# Patient Record
Sex: FEMALE | Race: WHITE | Employment: FULL TIME | URBAN - METROPOLITAN AREA
[De-identification: names, ages, dates, MRNs, and addresses within clinical notes are randomized per-mention and may not be internally consistent; named-entity substitution may affect disease eponyms.]

---

## 2020-03-25 ENCOUNTER — VIRTUAL VISIT (OUTPATIENT)
Dept: FAMILY MEDICINE CLINIC | Age: 30
End: 2020-03-25

## 2020-03-25 DIAGNOSIS — F33.1 MODERATE EPISODE OF RECURRENT MAJOR DEPRESSIVE DISORDER (HCC): Primary | ICD-10-CM

## 2020-03-25 DIAGNOSIS — E28.2 PCOS (POLYCYSTIC OVARIAN SYNDROME): ICD-10-CM

## 2020-03-25 PROBLEM — R87.619 ABNORMAL PAP SMEAR OF CERVIX: Status: ACTIVE | Noted: 2020-03-25

## 2020-03-25 PROBLEM — H52.229 REGULAR ASTIGMATISM: Status: ACTIVE | Noted: 2020-03-25

## 2020-03-25 PROBLEM — K21.9 ESOPHAGEAL REFLUX: Status: ACTIVE | Noted: 2020-03-25

## 2020-03-25 PROBLEM — F17.210 CIGARETTE SMOKER: Status: ACTIVE | Noted: 2020-03-25

## 2020-03-25 PROBLEM — M19.012 PRIMARY OSTEOARTHRITIS, LEFT SHOULDER: Status: ACTIVE | Noted: 2020-03-25

## 2020-03-25 PROBLEM — R87.619 ABNORMAL PAP SMEAR OF CERVIX: Status: RESOLVED | Noted: 2020-03-25 | Resolved: 2020-03-25

## 2020-03-25 RX ORDER — VENLAFAXINE HYDROCHLORIDE 37.5 MG/1
37.5 CAPSULE, EXTENDED RELEASE ORAL DAILY
Qty: 90 CAP | Refills: 0 | Status: SHIPPED | OUTPATIENT
Start: 2020-03-25 | End: 2020-04-27 | Stop reason: DRUGHIGH

## 2020-03-25 RX ORDER — HYDROXYZINE PAMOATE 25 MG/1
25 CAPSULE ORAL
Qty: 30 CAP | Refills: 0 | Status: SHIPPED | OUTPATIENT
Start: 2020-03-25 | End: 2020-07-10
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
## 2020-04-27 ENCOUNTER — VIRTUAL VISIT (OUTPATIENT)
Dept: FAMILY MEDICINE CLINIC | Age: 30
End: 2020-04-27

## 2020-04-27 DIAGNOSIS — F33.1 MODERATE EPISODE OF RECURRENT MAJOR DEPRESSIVE DISORDER (HCC): Primary | ICD-10-CM

## 2020-04-27 RX ORDER — VENLAFAXINE HYDROCHLORIDE 75 MG/1
75 CAPSULE, EXTENDED RELEASE ORAL DAILY
Qty: 30 CAP | Refills: 0 | Status: SHIPPED | OUTPATIENT
Start: 2020-04-27 | End: 2020-07-10 | Stop reason: DRUGHIGH

## 2020-04-27 RX ORDER — CRANBERRY FRUIT EXTRACT 650 MG
CAPSULE ORAL DAILY
COMMUNITY
End: 2021-08-31

## 2020-04-27 NOTE — PROGRESS NOTES
1. Have you been to the ER, urgent care clinic since your last visit? Hospitalized since your last visit? No    2. Have you seen or consulted any other health care providers outside of the [address] [place] [address] since your last visit? Include any pap smears or colon screening.  No    Chief Complaint   Patient presents with    Follow Up Chronic Condition    Depression     A little better with Effexor

## 2020-04-27 NOTE — PROGRESS NOTES
Krysta Amos. [address]      Heber Stanley is a 26 y.o. female who was seen by synchronous (real-time) audio-video technology on 4/27/2020. Consent: Heber Stanley, who was seen by synchronous (real-time) audio-video technology, and/or her healthcare decision maker, is aware that this patient-initiated, Telehealth encounter on 4/27/2020 is a billable service, with coverage as determined by her insurance carrier. She is aware that she may receive a bill and has provided verbal consent to proceed: Yes. Assessment & Plan:   Diagnoses and all orders for this visit:    1. Moderate episode of recurrent major depressive disorder (HCC)  -     venlafaxine-SR (EFFEXOR-XR) 75 mg capsule; Take 1 Cap by mouth daily. States she is still feeling depressed although may be slightly better  Has not found a psychiatrist yet, recommended she try going to the website for psychology today to find podiatrist/therapist  States she still having anxiety that is not helped with the hydroxyzine  We will increase her Effexor to 75 mg XR once a day  States she still having insomnia that is not helped with the hydroxyzine, informed her she could take 2 capsules for sleep    This note was dictated using Dragon dictation system. Every effort was made to ensure accuracy, although occasional spelling errors and word substitutions are expected due to dictation system limitations. Thank you for your understanding and patience. Follow-up and Dispositions    · Return in about 4 weeks (around 5/25/2020) for depression, virtual visit, 15 minutes. 712  Subjective:   Heber Stanley is a 26 y.o. female who was seen for   Follow Up Chronic Condition and Depression (A little better with Effexor 37.5mg. Have not seen a Psychiatrist yet.)      Depression Review:  Patient is seen for followup of depression. Treatment includes Effexor and have not found a psychiatrist yet. Ongoing symptoms include depressed mood, anhedonia, insomnia, hypersomnia, psychomotor agitation, fatigue, feelings of worthlessness/guilt, difficulty concentrating, hopelessness and recurrent thoughts of death. She denies weight loss, weight gain, psychomotor retardation, impaired memory, suicidal thoughts without plan, suicidal thoughts with specific plan and suicidal attempt. She experiences the following side effects from the treatment: none. Tried hydroxyzine for sleep took one pill and it did not help much. Tried hydroxyzine for anxiety and it did not help much. Feels like she has not done enough in her life, even though she has two jobs and is working on her masters degree in business administration. Denies SI/HI. Prior to Admission medications    Medication Sig Start Date End Date Taking? Authorizing Provider   inositoL-D chiro inositoL (Ovasitol) 2,000-50 mg pwpk Take  by mouth daily. 1 scoop daily   Yes Provider, Historical   venlafaxine-SR (EFFEXOR-XR) 75 mg capsule Take 1 Cap by mouth daily. 4/27/20  Yes Saul Lopez NP   hydrOXYzine pamoate (VISTARIL) 25 mg capsule Take 1 Cap by mouth three (3) times daily as needed for Itching. 3/25/20  Yes Saul Lopez NP   venlafaxine-SR [facility] 37.5 mg capsule Take 1 Cap by mouth daily.  3/25/20 4/27/20  Saul Lopez NP     No Known Allergies    Patient Active Problem List   Diagnosis Code    Cigarette smoker F17.210    Esophageal reflux K21.9    Primary osteoarthritis, left shoulder M19.012    Regular astigmatism H52.229    PCOS (polycystic ovarian syndrome) E28.2    Moderate episode of recurrent major depressive disorder ([facility] F33.1     Patient Active Problem List    Diagnosis Date Noted    Moderate episode of recurrent major depressive disorder ([facility] [address] [address] 04/27/2020    Cigarette smoker 03/25/2020    Esophageal reflux 03/25/2020    Primary osteoarthritis, left shoulder 03/25/2020    Regular astigmatism 03/25/2020  PCOS (polycystic ovarian syndrome) 03/25/2020     Current Outpatient Medications   Medication Sig Dispense Refill    inositoL-D chiro inositoL (Ovasitol) 2,000-50 mg pwpk Take  by mouth daily. 1 scoop daily      venlafaxine-SR (EFFEXOR-XR) 75 mg capsule Take 1 Cap by mouth daily. 30 Cap 0    hydrOXYzine pamoate (VISTARIL) 25 mg capsule Take 1 Cap by mouth three (3) times daily as needed for Itching. 30 Cap 0     No Known Allergies  Past Medical History:   Diagnosis Date    Polycystic ovarian syndrome      History reviewed. No pertinent surgical history. Family History   Problem Relation Age of Onset   24 [facility] Cancer Mother     Thyroid Disease Father     Heart Attack Father 39    Diabetes Maternal Grandmother      Social History     Tobacco Use    Smoking status: Current Every Day Smoker     Packs/day: 1.00     Years: 14.00     Pack years: 14.00    Smokeless tobacco: Never Used   Substance Use Topics    Alcohol use: Yes     Frequency: Monthly or less       ROS      Objective:     Visit Vitals  LMP 04/10/2020      General: alert, cooperative, no distress   Mental  status: normal mood, behavior, speech, dress, motor activity, and thought processes, able to follow commands   HENT: NCAT   Neck: no visualized mass   Resp: no respiratory distress   Neuro: no gross deficits   Skin: no discoloration or lesions of concern on visible areas   Psychiatric: normal affect, consistent with stated mood, no evidence of hallucinations     Additional exam findings: We discussed the expected course, resolution and complications of the diagnosis(es) in detail. Medication risks, benefits, costs, interactions, and alternatives were discussed as indicated. I advised her to contact the office if her condition worsens, changes or fails to improve as anticipated. She expressed understanding with the diagnosis(es) and plan. Paula Craig is a 26 y.o. female who was evaluated by a video visit encounter for concerns as above. Patient identification was verified prior to start of the visit. A caregiver was present when appropriate. Due to this being a TeleHealth encounter (During KRQN-51 public health emergency), evaluation of the following organ systems was limited: Vitals/Constitutional/EENT/Resp/CV/GI//MS/Neuro/Skin/Heme-Lymph-Imm. Pursuant to the emergency declaration under the [facility], [facility] waiver authority and the [employer] Act, this Virtual  Visit was conducted, with patient's (and/or legal guardian's) consent, to reduce the patient's risk of exposure to COVID-19 and provide necessary medical care. Services were provided through a video synchronous discussion virtually to substitute for in-person clinic visit. Patient and provider were located at their individual homes. An After Visit Summary was printed and given to the patient. All diagnosis have been discussed with the patient and all of the patient's questions have been answered. Follow-up and Dispositions    · Return in about 4 weeks (around 5/25/2020) for depression, virtual visit, 15 minutes. [place] [place] [id number] [address]   Lester Kelly

## 2020-07-10 ENCOUNTER — VIRTUAL VISIT (OUTPATIENT)
Dept: FAMILY MEDICINE CLINIC | Age: 30
End: 2020-07-10

## 2020-07-10 DIAGNOSIS — F33.1 MODERATE EPISODE OF RECURRENT MAJOR DEPRESSIVE DISORDER (HCC): Primary | ICD-10-CM

## 2020-07-10 RX ORDER — VENLAFAXINE HYDROCHLORIDE 150 MG/1
150 CAPSULE, EXTENDED RELEASE ORAL DAILY
COMMUNITY
Start: 2020-07-02 | End: 2021-08-31

## 2020-07-10 RX ORDER — ZOLPIDEM TARTRATE 10 MG/1
TABLET ORAL
COMMUNITY
Start: 2020-07-02 | End: 2021-08-31

## 2020-07-10 RX ORDER — PRAZOSIN HYDROCHLORIDE 1 MG/1
CAPSULE ORAL
COMMUNITY
Start: 2020-07-02 | End: 2021-10-11

## 2020-07-10 NOTE — PROGRESS NOTES
Chief Complaint   Patient presents with    Depression     4 week f/u- Found medication manager         1. Have you been to the ER, urgent care clinic since your last visit? Hospitalized since your last visit? NO    2. Have you seen or consulted any other health care providers outside of the [address] [place] [address] since your last visit? Include any pap smears or colon screening.  NO

## 2020-07-10 NOTE — PROGRESS NOTES
Krysta Amos[address]      Aura Oliva is a 26 y.o. female who was seen by synchronous (real-time) audio-video technology on 7/10/2020. Consent: Aura Oliva, who was seen by synchronous (real-time) audio-video technology, and/or her healthcare decision maker, is aware that this patient-initiated, Telehealth encounter on 7/10/2020 is a billable service, with coverage as determined by her insurance carrier. She is aware that she may receive a bill and has provided verbal consent to proceed: Yes. Assessment & Plan:   Diagnoses and all orders for this visit:    1. Moderate episode of recurrent major depressive disorder ([facility] [address] [address]    managing well with her depression  Seeing psychiatry with medication changes and is in counseling  gave her the phone number to the gynecologist she was referred to for her PCOS    Follow-up and Dispositions    · Return if symptoms worsen or fail to improve. 712  Subjective:   Aura Oliva is a 26 y.o. female who was seen for   Depression (4 week f/u- Found medication manager)    Depression Review:  Patient is seen for followup of depression. Treatment includes Effexor, ambien and minipress and individual therapy. Ongoing symptoms include depressed mood, insomnia and feelings of worthlessness/guilt. She denies weight loss, weight gain, hypersomnia and hopelessness. She experiences the following side effects from the treatment: none. [facility]: added Vickie Chris and minipress and increased Effexor to 150mg  Counselor: Alyssa Gonzalez at the same location  Is having a psychological screening on Monday  Having trouble with her son, the 5year old, he has been diagnosed with ADHD and DMDD      Prior to Admission medications    Medication Sig Start Date End Date Taking?  Authorizing Provider   zolpidem (AMBIEN) 10 mg tablet TK 1 T PO QHS PRF INSOMNIA 7/2/20  Yes Provider, Historical   prazosin (MINIPRESS) 1 mg capsule TK 1 C PO QHS 7/2/20  Yes Provider, Historical   inositoL-D chiro inositoL (Ovasitol) 2,000-50 mg pwpk Take  by mouth daily. 1 scoop daily   Yes Provider, Historical   venlafaxine-SR (EFFEXOR-XR) 150 mg capsule Take 150 mg by mouth daily. 7/2/20   Provider, Historical   venlafaxine-SR (EFFEXOR-XR) 75 mg capsule Take 1 Cap by mouth daily. 4/27/20 7/10/20  Rafa Reyna NP   hydrOXYzine pamoate (VISTARIL) 25 mg capsule Take 1 Cap by mouth three (3) times daily as needed for Itching. 3/25/20 7/10/20  Rafa Reyna NP     No Known Allergies    Patient Active Problem List   Diagnosis Code    Cigarette smoker F17.210    Esophageal reflux K21.9    Primary osteoarthritis, left shoulder M19.012    Regular astigmatism H52.229    PCOS (polycystic ovarian syndrome) E28.2    Moderate episode of recurrent major depressive disorder ([facility] F33.1     Patient Active Problem List    Diagnosis Date Noted    Moderate episode of recurrent major depressive disorder ([facility] 04/27/2020    Cigarette smoker 03/25/2020    Esophageal reflux 03/25/2020    Primary osteoarthritis, left shoulder 03/25/2020    Regular astigmatism 03/25/2020    PCOS (polycystic ovarian syndrome) 03/25/2020     Current Outpatient Medications   Medication Sig Dispense Refill    zolpidem (AMBIEN) 10 mg tablet TK 1 T PO QHS PRF INSOMNIA      prazosin (MINIPRESS) 1 mg capsule TK 1 C PO QHS      inositoL-D chiro inositoL (Ovasitol) 2,000-50 mg pwpk Take  by mouth daily. 1 scoop daily      venlafaxine-SR (EFFEXOR-XR) 150 mg capsule Take 150 mg by mouth daily. No Known Allergies  Past Medical History:   Diagnosis Date    Polycystic ovarian syndrome      History reviewed. No pertinent surgical history.   Family History   Problem Relation Age of Onset    Cancer Mother     Thyroid Disease Father     Heart Attack Father 39    Diabetes Maternal Grandmother      Social History     Tobacco Use    Smoking status: Current Every Day Smoker     Packs/day: 1.00     Years: 14.00     Pack years: 14.00    Smokeless tobacco: Never Used   Substance Use Topics    Alcohol use: Yes     Frequency: Monthly or less       ROS  As stated in HPI, otherwise all others negative. Objective: There were no vitals taken for this visit. General: alert, cooperative, no distress   Mental  status: normal mood, behavior, speech, dress, motor activity, and thought processes, able to follow commands   HENT: NCAT   Neck: no visualized mass   Resp: no respiratory distress   Neuro: no gross deficits   Skin: no discoloration or lesions of concern on visible areas   Psychiatric: normal affect, consistent with stated mood, no evidence of hallucinations     Additional exam findings: We discussed the expected course, resolution and complications of the diagnosis(es) in detail. Medication risks, benefits, costs, interactions, and alternatives were discussed as indicated. I advised her to contact the office if her condition worsens, changes or fails to improve as anticipated. She expressed understanding with the diagnosis(es) and plan. Cristal Gaviria is a 26 y.o. female who was evaluated by a video visit encounter for concerns as above. Patient identification was verified prior to start of the visit. A caregiver was present when appropriate. Due to this being a TeleHealth encounter (During WJFTT-53 public health emergency), evaluation of the following organ systems was limited: Vitals/Constitutional/EENT/Resp/CV/GI//MS/Neuro/Skin/Heme-Lymph-Imm. Pursuant to the emergency declaration under the [facility] waiver authority and the [employer] General Act, this Virtual  Visit was conducted, with patient's (and/or legal guardian's) consent, to reduce the patient's risk of exposure to COVID-19 and provide necessary medical care.      Services were provided through a video synchronous discussion virtually to substitute for in-person clinic visit. Patient and provider were located at their individual homes. An After Visit Summary was printed and given to the patient. All diagnosis have been discussed with the patient and all of the patient's questions have been answered. Follow-up and Dispositions    · Return if symptoms worsen or fail to improve. Chemo Mock, RADHA-[address]   Lester Kelly [address]

## 2020-07-10 NOTE — PATIENT INSTRUCTIONS
You have been referred to Nilsa Echeverria, Gynecology Please call them to make an appointment [phone] Not available [address] [facility] [address] [id number]

## 2021-08-31 ENCOUNTER — OFFICE VISIT (OUTPATIENT)
Dept: FAMILY MEDICINE CLINIC | Age: 31
End: 2021-08-31
Payer: OTHER GOVERNMENT

## 2021-08-31 VITALS
HEIGHT: 62 IN | OXYGEN SATURATION: 98 % | DIASTOLIC BLOOD PRESSURE: 81 MMHG | WEIGHT: 183.5 LBS | BODY MASS INDEX: 33.77 KG/M2 | HEART RATE: 104 BPM | RESPIRATION RATE: 18 BRPM | SYSTOLIC BLOOD PRESSURE: 106 MMHG | TEMPERATURE: 98.2 F

## 2021-08-31 DIAGNOSIS — E66.09 CLASS 1 OBESITY DUE TO EXCESS CALORIES WITHOUT SERIOUS COMORBIDITY WITH BODY MASS INDEX (BMI) OF 33.0 TO 33.9 IN ADULT: ICD-10-CM

## 2021-08-31 DIAGNOSIS — F33.1 MODERATE EPISODE OF RECURRENT MAJOR DEPRESSIVE DISORDER (HCC): Primary | ICD-10-CM

## 2021-08-31 PROCEDURE — 99214 OFFICE O/P EST MOD 30 MIN: CPT | Performed by: NURSE PRACTITIONER

## 2021-08-31 RX ORDER — SPIRONOLACTONE 50 MG/1
50 TABLET, FILM COATED ORAL DAILY
COMMUNITY
Start: 2021-07-29

## 2021-08-31 RX ORDER — DESVENLAFAXINE 100 MG/1
1 TABLET, EXTENDED RELEASE ORAL DAILY
COMMUNITY
Start: 2021-07-07

## 2021-08-31 RX ORDER — LURASIDONE HYDROCHLORIDE 60 MG/1
60 TABLET, FILM COATED ORAL DAILY
COMMUNITY
Start: 2021-08-13

## 2021-08-31 RX ORDER — PEN NEEDLE, DIABETIC 30 GX3/16"
NEEDLE, DISPOSABLE MISCELLANEOUS
Qty: 100 EACH | Refills: 11 | Status: SHIPPED | OUTPATIENT
Start: 2021-08-31 | End: 2021-11-12

## 2021-08-31 RX ORDER — METFORMIN HYDROCHLORIDE 500 MG/1
TABLET, EXTENDED RELEASE ORAL
COMMUNITY
Start: 2021-07-29

## 2021-08-31 RX ORDER — MELATONIN
COMMUNITY
Start: 2021-06-01

## 2021-08-31 RX ORDER — LIRAGLUTIDE 6 MG/ML
INJECTION, SOLUTION SUBCUTANEOUS
Qty: 5 EACH | Refills: 0 | Status: SHIPPED | OUTPATIENT
Start: 2021-08-31 | End: 2021-10-15

## 2021-08-31 RX ORDER — DEXTROAMPHETAMINE SACCHARATE, AMPHETAMINE ASPARTATE MONOHYDRATE, DEXTROAMPHETAMINE SULFATE AND AMPHETAMINE SULFATE 5; 5; 5; 5 MG/1; MG/1; MG/1; MG/1
CAPSULE, EXTENDED RELEASE ORAL
COMMUNITY
Start: 2021-08-23

## 2021-08-31 RX ORDER — ZOLPIDEM TARTRATE 5 MG/1
TABLET ORAL
COMMUNITY
Start: 2021-08-23 | End: 2021-10-11

## 2021-08-31 NOTE — PROGRESS NOTES
Room Number 8    Did you take your medication today ? Yes     1. Have you been to the ER, urgent care clinic since your last visit? Hospitalized since your last visit? No    2. Have you seen or consulted any other health care providers outside of the [address] [place] [address] since your last visit? Include any pap smears or colon screening.  No       Health Maintenance Due   Topic Date Due    Hepatitis C Screening  Never done    Pneumococcal 0-64 years (1 of 2 - PPSV23) Never done    PAP AKA CERVICAL CYTOLOGY  Never done patient states not today

## 2021-08-31 NOTE — PATIENT INSTRUCTIONS
Learning About the [facility] Diet  What is the Mediterranean diet? The Mediterranean diet is a style of eating rather than a diet plan. It features foods eaten in [place], [place], [place] and [place], and other countries along the [facility]. It emphasizes eating foods like fish, fruits, vegetables, beans, high-fiber breads and whole grains, nuts, and olive oil. This style of eating includes limited red meat, cheese, and sweets. Why choose the Mediterranean diet? A Mediterranean-style diet may improve heart health. It contains more fat than other heart-healthy diets. But the fats are mainly from nuts, unsaturated oils (such as fish oils and olive oil), and certain nut or seed oils (such as canola, soybean, or flaxseed oil). These fats may help protect the heart and blood vessels. How can you get started on the Mediterranean diet? Here are some things you can do to switch to a more Mediterranean way of eating. What to eat  · Eat a variety of fruits and vegetables each day, such as grapes, blueberries, tomatoes, broccoli, peppers, figs, olives, spinach, eggplant, beans, lentils, and chickpeas. · Eat a variety of whole-grain foods each day, such as oats, brown rice, and whole wheat bread, pasta, and couscous. · Eat fish at least 2 times a week. Try tuna, salmon, mackerel, lake trout, herring, or sardines. · Eat moderate amounts of low-fat dairy products, such as milk, cheese, or yogurt. · Eat moderate amounts of poultry and eggs. · Choose healthy (unsaturated) fats, such as nuts, olive oil, and certain nut or seed oils like canola, soybean, and flaxseed. · Limit unhealthy (saturated) fats, such as butter, palm oil, and coconut oil. And limit fats found in animal products, such as meat and dairy products made with whole milk. Try to eat red meat only a few times a month in very small amounts. · Limit sweets and desserts to only a few times a week.  This includes sugar-sweetened drinks like soda.  The Mediterranean diet may also include red wine with your meal--1 glass each day for women and up to 2 glasses a day for men. Tips for eating at home  · Use herbs, spices, garlic, lemon zest, and citrus juice instead of salt to add flavor to foods. · Add avocado slices to your sandwich instead of butt. · Have fish for lunch or dinner instead of red meat. Brush the fish with olive oil, and broil or grill it. · Sprinkle your salad with seeds or nuts instead of cheese. · Cook with olive or canola oil instead of butter or oils that are high in saturated fat. · Switch from 2% milk or whole milk to 1% or fat-free milk. · Dip raw vegetables in a vinaigrette dressing or hummus instead of dips made from mayonnaise or sour cream.  · Have a piece of fruit for dessert instead of a piece of cake. Try baked apples, or have some dried fruit. Tips for eating out  · Try broiled, grilled, baked, or poached fish instead of having it fried or breaded. · Ask your  to have your meals prepared with olive oil instead of butter. · Order dishes made with marinara sauce or sauces made from olive oil. Avoid sauces made from cream or mayonnaise. · Choose whole-grain breads, whole wheat pasta and pizza crust, brown rice, beans, and lentils. · Cut back on butter or margarine on bread. Instead, you can dip your bread in a small amount of olive oil. · Ask for a side salad or grilled vegetables instead of french fries or chips. Where can you learn more? Go to http://www.pineda.com/  Enter O407 in the search box to learn more about \"Learning About the Mediterranean Diet. \"  Current as of: August 22, 2019               Content Version: 12.6  © 1978-3677 [employer]. Care instructions adapted under license by [employer] (which disclaims liability or warranty for this information).  If you have questions about a medical condition or this instruction, always ask your healthcare professional. [address] any warranty or liability for your use of this information.
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
## 2021-10-11 ENCOUNTER — OFFICE VISIT (OUTPATIENT)
Dept: FAMILY MEDICINE CLINIC | Age: 31
End: 2021-10-11
Payer: OTHER GOVERNMENT

## 2021-10-11 VITALS
HEIGHT: 62 IN | RESPIRATION RATE: 18 BRPM | TEMPERATURE: 99.2 F | WEIGHT: 176 LBS | BODY MASS INDEX: 32.39 KG/M2 | DIASTOLIC BLOOD PRESSURE: 72 MMHG | HEART RATE: 121 BPM | OXYGEN SATURATION: 99 % | SYSTOLIC BLOOD PRESSURE: 113 MMHG

## 2021-10-11 DIAGNOSIS — Z11.59 NEED FOR HEPATITIS C SCREENING TEST: ICD-10-CM

## 2021-10-11 DIAGNOSIS — Z01.419 WELL WOMAN EXAM: ICD-10-CM

## 2021-10-11 DIAGNOSIS — M25.531 RIGHT WRIST PAIN: ICD-10-CM

## 2021-10-11 DIAGNOSIS — S89.91XA INJURY OF RIGHT KNEE, INITIAL ENCOUNTER: Primary | ICD-10-CM

## 2021-10-11 DIAGNOSIS — R10.32 LEFT LOWER QUADRANT ABDOMINAL PAIN: ICD-10-CM

## 2021-10-11 PROBLEM — F43.10 POSTTRAUMATIC STRESS DISORDER: Status: ACTIVE | Noted: 2021-10-11

## 2021-10-11 PROBLEM — F31.81 BIPOLAR II DISORDER (HCC): Status: ACTIVE | Noted: 2021-10-11

## 2021-10-11 PROBLEM — G47.00 INSOMNIA: Status: ACTIVE | Noted: 2021-10-11

## 2021-10-11 PROBLEM — F90.9 ATTENTION DEFICIT HYPERACTIVITY DISORDER (ADHD): Status: ACTIVE | Noted: 2021-10-11

## 2021-10-11 PROCEDURE — 99214 OFFICE O/P EST MOD 30 MIN: CPT | Performed by: NURSE PRACTITIONER

## 2021-10-11 RX ORDER — IBUPROFEN 800 MG/1
800 TABLET ORAL
Qty: 30 TABLET | Refills: 1 | Status: SHIPPED | OUTPATIENT
Start: 2021-10-11 | End: 2021-10-25

## 2021-10-11 NOTE — PATIENT INSTRUCTIONS
Apply ice or heat to wrist for comfort  Wear a wrist brace  Take ibuprofen as follows: Three times a day for 3 days and then   Two times a day for three days and then   Once a day for three days     Radha Lokijamie Disease: Exercises  Introduction  Here are some examples of exercises for you to try. The exercises may be suggested for a condition or for rehabilitation. Start each exercise slowly. Ease off the exercises if you start to have pain. You will be told when to start these exercises and which ones will work best for you. How to do the exercises  Thumb lifts    1. Place your hand on a flat surface, with your palm up. 2. Lift your thumb away from your palm to make a \"C\" shape. 3. Hold for about 6 seconds. 4. Repeat 8 to 12 times. Passive thumb MP flexion    1. Hold your hand in front of you, and turn your hand so your little finger faces down and your thumb faces up. (Your hand should be in the position used for shaking someone's hand.) You may also rest your hand on a flat surface. 2. Use the fingers on your other hand to bend your thumb down at the point where your thumb connects to your palm. 3. Hold for at least 15 to 30 seconds. 4. Repeat 2 to 4 times. Finkelstein stretch    1. Hold your arms out in front of you. (Your hand should be in the position used for shaking someone's hand.)  2. Bend your thumb toward your palm. 3. Use your other hand to gently stretch your thumb and wrist downward until you feel the stretch on the thumb side of your wrist.  4. Hold for at least 15 to 30 seconds. 5. Repeat 2 to 4 times. Resisted ulnar deviation    For this exercise, you will need elastic exercise material, such as surgical tubing or Thera-Band. 1. Sit leaning forward with your legs slightly spread and your elbow on your thigh. 2. Grasp one end of the band with your palm down, and step on the other end with the foot opposite the hand holding the band.   3. Slowly bend your wrist sideways and away from your knee. 4. Repeat 8 to 12 times. Follow-up care is a key part of your treatment and safety. Be sure to make and go to all appointments, and call your doctor if you are having problems. It's also a good idea to know your test results and keep a list of the medicines you take. Where can you learn more? Go to http://www.gray.com/  Enter O599 in the search box to learn more about \"De Quervain's Disease: Exercises. \"  Current as of: July 1, 2021               Content Version: 13.0  © 9005-8033 [employer]. Care instructions adapted under license by [employer] (which disclaims liability or warranty for this information). If you have questions about a medical condition or this instruction, always ask your healthcare professional. [address] any warranty or liability for your use of this information. Abdominal Strain: Rehab Exercises  Introduction  Here are some examples of exercises for you to try. The exercises may be suggested for a condition or for rehabilitation. Start each exercise slowly. Ease off the exercises if you start to have pain. You will be told when to start these exercises and which ones will work best for you. How to do the exercises  Tummy tuck    1. Lie on your back with your knees bent. Place two fingers just inside your hip bones so you can feel your lower belly muscles. 2. Take a deep breath in.  3. As you breathe out, pull your belly button in toward your spine, as if you are trying to zip up a tight pair of jeans. You should feel your lower belly muscles pull slightly away from your fingers as the muscles tighten. 4. Hold for about 6 seconds, but do not hold your breath. 5. Relax up to 10 seconds. 6. Repeat 8 to 12 times. 7. Repeat several times a day, and try to hold your lower belly muscles in for longer as you get stronger.   8. Practice doing this exercise while you are standing, such as when you are standing in line, or sitting. Pelvic tilt    1. Lie on your back with your knees bent. 2. \"Brace\" your stomach--tighten your muscles by pulling in and imagining your belly button moving toward your spine. 3. Press your lower back into the floor. You should feel your hips and pelvis rock back. 4. Hold for 6 seconds while breathing smoothly. 5. Relax and allow your pelvis and hips to rock forward. 6. Repeat 8 to 12 times. Curl-up    1. Lie down with your knees bent and your arms at your sides. Keep your feet flat on the floor. 2. Lift your head and shoulders up a few inches. At the same time, raise your arms to about thigh level. 3. Hold for 6 seconds. 4. Relax and return to your starting position. 5. Repeat 8 to 12 times. Diagonal curl-up    1. Lie down with your knees bent and your arms at your sides. Keep your feet flat on the floor. 2. Lift your head and shoulders up. At the same time, reach to one side with both arms. 3. Hold for 6 seconds. 4. Relax and return to your starting position. 5. Repeat 8 to 12 times. 6. Repeat the same steps on your other side. Follow-up care is a key part of your treatment and safety. Be sure to make and go to all appointments, and call your doctor if you are having problems. It's also a good idea to know your test results and keep a list of the medicines you take. Where can you learn more? Go to http://www.gray.com/  Enter E223 in the search box to learn more about \"Abdominal Strain: Rehab Exercises. \"  Current as of: July 1, 2021               Content Version: 13.0  © 5498-8823 [employer]. Care instructions adapted under license by [employer] (which disclaims liability or warranty for this information).  If you have questions about a medical condition or this instruction, always ask your healthcare professional. [address] any warranty or liability for your use of this information.

## 2021-10-11 NOTE — PROGRESS NOTES
Room 7    Did patient bring someone? No    Did the patient have DME equipment? No    Did you take your medication today? No       1. Have you been to the ER, urgent care clinic since your last visit? Hospitalized since your last visit? Yes [facility]     2. Have you seen or consulted any other health care providers outside of the [address] [place] [address] since your last visit? Include any pap smears or colon screening.  No      Health Maintenance Due   Topic Date Due    Hepatitis C Screening  Never done    Pneumococcal 0-64 years (1 of 2 - PPSV23) Never done    Cervical cancer screen  Never done    Flu Vaccine (1) 09/01/2021

## 2021-10-11 NOTE — PROGRESS NOTES
Krysta Amos. Formerly [facility], later [facility]      Maddie Brito is a 27 y.o. female and presents with Hospital Follow Up       Assessment/Plan:    Diagnoses and all orders for this visit:    1. Injury of right knee, initial encounter  -     REFERRAL TO SPORTS MEDICINE  -     ibuprofen (MOTRIN) 800 mg tablet; Take 1 Tablet by mouth every eight (8) hours as needed for Pain. 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 Right wrist pain  -     REFERRAL TO HAND SURGERY  -     ibuprofen (MOTRIN) 800 mg tablet; Take 1 Tablet by mouth every eight (8) hours as needed for Pain. approx two days prior to this visit she lifted a mug and has since had pain and decreased ROM to her right wrist  There is an area of erythema where she endorses pain  She had a positive finkelstein test  Advised to wear a brace, use ice and heat for comfort and referral placed to hand specialist  3. Left lower quadrant abdominal pain  Pain in her abdomen is mos likely a muscle strain, hand out on stretches and exercises she can do at home provided in AVS    Patient verbalized understanding and is in agreement with this plan of care. Labs for prior to CPE ordered    Follow-up and Dispositions    · Return in about 1 year (around 10/11/2022) for CPE, w/gyn, 30 min, office only, AND, labs prior.            Health Maintenance:   Health Maintenance   Topic Date Due    Hepatitis C Screening  Never done    Pneumococcal 0-64 years (1 of 2 - PPSV23) Never done    Cervical cancer screen  Never done    Flu Vaccine (1) 09/01/2021    DTaP/Tdap/Td series (4 - Td or Tdap) 09/11/2030    COVID-19 Vaccine  Completed        Subjective:    Acute visit today  9/25/21 went to [facility] ED for abdominal and knee pain when exercising  This is a new problem  In the ED had US of abdomen completed- she was told there was no hernia or cysts rupturing  Onset: 9/25/21  Location: right knee, LLQ abdomen  Characteristics: about a week prior injured her knee while running, felt pain popliteal area, denies swelling or erythema after the injury, the pain persists, happens after standing for a while and even when driving, pain is a burning upwards and sometimes accompanied with a cramp, rates pain at worst a 6-7/10, at lowest 1-2/10 dull, just there, the pain is constant  No prior injury to knee, treatments have been ibuprofen and stretching, this relieves the pain some  No brace, ice, heat    LLQ abdomen: Onset: 9/25/21  Characteristics: was doing flexed arm hang and had to pull her legs up, sudden onset of burning pain, about size of 1/2 dollar  The pain continued for about 2 days, then only pain with straining: coughin, sit ups about a week later the pain was there and radiated medially about 3 inches  She has not used her abs for about a week, told not to until seen by PCP    Right wrist pain  Happened about two days ago  Picked up a coffee mug and got a sharp pain in her wrist and thumb  Now very painful to movement with decreased ROM  Redness on lateral anterior wrist    ROS:     ROS  As stated in HPI, otherwise all others negative. The problem list was updated as a part of today's visit.   Patient Active Problem List   Diagnosis Code    Cigarette smoker F17.210    Esophageal reflux K21.9    Primary osteoarthritis, left shoulder M19.012    Regular astigmatism H52.229    PCOS (polycystic ovarian syndrome) E28.2    Moderate episode of recurrent major depressive disorder ([facility] [address] F33.1    Class 1 obesity due to excess calories without serious comorbidity with body mass index (BMI) of 33.0 to 33.9 in adult E66.09, Z68.33       The PSH, FH were reviewed. SH:  Social History     Tobacco Use    Smoking status: Current Every Day Smoker     Packs/day: 1.00     Years: 14.00     Pack years: 14.00    Smokeless tobacco: Never Used   Substance Use Topics    Alcohol use: Yes    Drug use: Not Currently       Medications/Allergies:  Current Outpatient Medications on File Prior to Visit   Medication Sig Dispense Refill    cholecalciferol (VITAMIN D3) (1000 Units /25 mcg) tablet       Desvenlafaxine 100 mg Tb24 Take 1 Tablet by mouth daily.  amphetamine-dextroamphetamine XR (ADDERALL XR) 20 mg XR capsule TAKE 2 CAPSULES BY MOUTH EVERY MORNING      Latuda 60 mg tab tablet Take 60 mg by mouth daily.  metFORMIN ER (GLUCOPHAGE XR) 500 mg tablet TAKE 1 TABLET BY MOUTH EVERY DAY WITH THE EVENING MEAL      spironolactone (ALDACTONE) 50 mg tablet Take 50 mg by mouth daily.  liraglutide, weight loss, (Saxenda) 3 mg/0.5 mL (18 mg/3 mL) pen Start with 0.6mg subcutaneous every day, increase by 0.6mg weekly to max dose of 3mg a day. Continue at 3mg dosage daily. (Patient not taking: Reported on 10/11/2021) 5 Each 0    Insulin Needles, Disposable, 31 gauge x 5/16\" ndle Use to inject saxenda daily (Patient not taking: Reported on 10/11/2021) 100 Each 11    [DISCONTINUED] zolpidem (AMBIEN) 5 mg tablet TAKE 1 TABLET BY MOUTH EVERY NIGHT AT BEDTIME AS NEEDED FOR INSOMNIA (Patient not taking: Reported on 10/11/2021)      [DISCONTINUED] prazosin (MINIPRESS) 1 mg capsule TK 1 C PO QHS (Patient not taking: Reported on 10/11/2021)       No current facility-administered medications on file prior to visit.         No Known Allergies    Objective:  Visit Vitals  /72   Pulse (!) 121   Temp 99.2 °F (37.3 °C) (Temporal)   Resp 18   Ht 5' 2\" (1.575 m)   Wt 176 lb (79.8 kg)   SpO2 99%   BMI 32.19 kg/m²    Body mass index is 32.19 kg/m². Physical assessment  Physical Exam  Vitals and nursing note reviewed. Eyes:      Conjunctiva/sclera: Conjunctivae normal.      Pupils: Pupils are equal, round, and reactive to light. Neck:      Vascular: No JVD. Cardiovascular:      Rate and Rhythm: Normal rate and regular rhythm. Heart sounds: Normal heart sounds. No murmur heard. No friction rub. No gallop. Pulmonary:      Effort: Pulmonary effort is normal.      Breath sounds: Normal breath sounds. Musculoskeletal:      Right wrist: Tenderness present. Decreased range of motion. Arms:       Cervical back: Normal range of motion. Right knee: Swelling present. No erythema, bony tenderness or crepitus. Normal range of motion. Tenderness present. Legs:       Comments: Positive finkelstein test   Skin:     General: Skin is warm and dry. Neurological:      Mental Status: She is alert and oriented to person, place, and time. Psychiatric:         Mood and Affect: Affect normal.         Cognition and Memory: Memory normal.         Judgment: Judgment normal.           Labwork and Ancillary Studies:    CBC w/Diff  No results found for: WBC, WBCLT, HGB, HGBP, PLT, HGBEXT, PLTEXT, HGBEXT, PLTEXT      Basic Metabolic Profile  No results found for: NA, K, CL, CO2, AGAP, GLU, BUN, CREA, BUCR, GFRAA, GFRNA, CA, GFRAA     Cholesterol  No results found for: CHOL, CHOLX, CHLST, CHOLV, HDL, HDLP, LDL, LDLC, DLDLP, TGLX, TRIGL, TRIGP, CHHD, CHHDX        I have discussed the diagnosis with the patient and the intended plan as seen in the above orders. The patient has received an After-Visit Summary and questions were answered concerning future plans. An After Visit Summary was printed and given to the patient. All diagnosis have been discussed with the patient and all of the patient's questions have been answered.      Follow-up and Dispositions    · Return in about 1 year (around 10/11/2022) for CPE, w/gyn, 30 min, office only, AND, labs prior. Jose Fcorinne Hi, [facility]  [address] [facility]   [address] [facility] [address]  [id number]

## 2021-10-11 NOTE — LETTER
NOTIFICATION RETURN TO WORK / SCHOOL    10/11/2021 10:49 AM    Ms. Tri Chambers  [address]      To Whom It May Concern:    Tri Chambers is currently under the care of Shellie Sosa. She should abstain from general PT until seen by specialist for her knee and wrist.     If there are questions or concerns please have the patient contact our office.         Sincerely,      Alexandria Case NP

## 2021-10-15 ENCOUNTER — PATIENT MESSAGE (OUTPATIENT)
Dept: FAMILY MEDICINE CLINIC | Age: 31
End: 2021-10-15

## 2021-10-15 DIAGNOSIS — E66.09 CLASS 1 OBESITY DUE TO EXCESS CALORIES WITHOUT SERIOUS COMORBIDITY WITH BODY MASS INDEX (BMI) OF 33.0 TO 33.9 IN ADULT: Primary | ICD-10-CM

## 2021-10-15 NOTE — TELEPHONE ENCOUNTER
From: Caesar Dougherty NP  To: Zan Garza  Sent: 10/15/2021 1:25 PM EDT  Subject: saxenda    I received the prior authorization back for Saxenda. You have to try and fail several other medications prior to approval. Two of the medications are contraindicated for you because you are on adderall. They are phentermine and qsymia-which is a combination of topamax and phentermine. The other medications are xenacal-which is a fat blocker where as you colon does not absorb the fat your eat. The biggest side effect with this drug is stomach upset and leakage of stool-fecal leakage. The other is contrave which is a combination of wellbutrin and naltrexone. Would like to move forward with trying one of the two medications xenacal or contrave?

## 2021-10-25 DIAGNOSIS — M25.531 RIGHT WRIST PAIN: ICD-10-CM

## 2021-10-25 DIAGNOSIS — S89.91XA INJURY OF RIGHT KNEE, INITIAL ENCOUNTER: ICD-10-CM

## 2021-10-25 RX ORDER — IBUPROFEN 800 MG/1
TABLET ORAL
Qty: 30 TABLET | Refills: 1 | Status: SHIPPED | OUTPATIENT
Start: 2021-10-25 | End: 2021-11-08

## 2021-11-08 DIAGNOSIS — M25.531 RIGHT WRIST PAIN: ICD-10-CM

## 2021-11-08 DIAGNOSIS — S89.91XA INJURY OF RIGHT KNEE, INITIAL ENCOUNTER: ICD-10-CM

## 2021-11-08 RX ORDER — IBUPROFEN 800 MG/1
TABLET ORAL
Qty: 30 TABLET | Refills: 1 | Status: SHIPPED | OUTPATIENT
Start: 2021-11-08

## 2021-11-12 ENCOUNTER — OFFICE VISIT (OUTPATIENT)
Dept: SPORTS MEDICINE | Age: 31
End: 2021-11-12
Payer: OTHER GOVERNMENT

## 2021-11-12 VITALS — HEART RATE: 82 BPM | RESPIRATION RATE: 16 BRPM | TEMPERATURE: 98.3 F

## 2021-11-12 DIAGNOSIS — M25.561 RIGHT KNEE PAIN, UNSPECIFIED CHRONICITY: Primary | ICD-10-CM

## 2021-11-12 DIAGNOSIS — M25.551 RIGHT HIP PAIN: ICD-10-CM

## 2021-11-12 PROCEDURE — 99203 OFFICE O/P NEW LOW 30 MIN: CPT | Performed by: FAMILY MEDICINE

## 2021-11-12 NOTE — PROGRESS NOTES
[address] [facility]  Sports Medicine Consultation Note    PCP: Stanislav Meredith NP  Requesting provider: Stanislav Meredith NP       Alaina Hanson is a 27 y.o. female (: 1990) presenting to obtain consultative services regarding:  Chief Complaint   Patient presents with    Knee Pain     right       Assessment/Plan:       ICD-10-CM ICD-9-CM   1. Right knee pain, unspecified chronicity  M25.561 719.46   2. 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 Pertinent exam findings: RIGHT glute med/piriformis tenderness with RIGHT glute & VMO weakness as well as tenderness over pes anserine, distal sartorius & semiT on the RIGHT. Also has some mild medial & lateral joint line tenderness on palpation however negative meniscal testing. Impression:  RIGHT knee pain - likely 2/2 pes anserine bursitis / glute & VMO weakness  RIGHT hip pain    Plan:  > gentle hamstring & glute stretches for now  > ice as needed  > start formal PT  > note for no participation in physical training until re-eval        Follow-up and Dispositions    · Return in about 10 weeks (around 2022). Orders Placed This Encounter    XR KNEE RT MIN 4 V     STANDING AP, lateral, notch & sunrise views please. Standing Status:   Future     Number of Occurrences:   1     Standing Expiration Date:   2022     Order Specific Question:   Is Patient Pregnant? Answer:   No     Order Specific Question:   Which facility to perform procedure? Answer:   [facility]     Referral Priority:   Routine     Referral Type:   PT/OT/ST     Referral Reason:   Specialty Services Required     Number of Visits Requested:   1         Management plan & patient instructions discussed with Alaina Hanson, who voiced understanding.     Thank you for the opportunity to participate in the care of this patient. If any questions or concerns at all, please feel free to contact me. This document may have been created with the aid of dictation software. Text may contain errors, particularly phonetic errors. Lance Ramachandran MD  Internal Medicine, Family Medicine & Sports Medicine  11/12/2021    On this date 11/12/2021, I have spent 30 minutes providing care to this patient, which included reviewing the EMR to see if there were any recent visits to the ED, specialists, prior lab or radiology results, obtaining the history from the patient, examining the patient, providing discharge education regarding the diagnosis and counseling on appropriate follow-up, as well as documenting this visit in the EMR. 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 Then pain 30min later after the end of the run  Did not notice herself significant swelling  Mainly posterior then radiates  Worse with prolonged sitting and standing  Tried: NSAIDs (takes edge off of pain) and home exercise plan (made pain worse)  Currently 1 of 10  Worst was 2 weeks after, 7 of 10 - antalgic gait  No instability  No numbness/tingling  + mild warmth to the touch when aggrevated    No issues with RIGHT knee before    Prior Treatments for this complaint:    none    Previous Medications for this complaint:    IBU    Previous work-up for this complaint has included:    none    Past Medical History:   Diagnosis Date    Polycystic ovarian syndrome      History reviewed.  No pertinent surgical history. reports that she has been smoking. She has a 14.00 pack-year smoking history. She has never used smokeless tobacco. She reports current alcohol use. She reports previous drug use. Family History   Problem Relation Age of Onset    Cancer Mother     Thyroid Disease Father     Heart Attack Father 39    Diabetes Maternal Grandmother      No Known Allergies    Problem List:      Patient Active Problem List    Diagnosis    Attention deficit hyperactivity disorder (ADHD)    Bipolar II disorder (HCC)    Insomnia    Posttraumatic stress disorder    Class 1 obesity due to excess calories without serious comorbidity with body mass index (BMI) of 33.0 to 33.9 in adult    Moderate episode of recurrent major depressive disorder ([facility] [address]    Cigarette smoker     Age started: 15yo, smoking avg 1ppd (updated 9/2021)      Esophageal reflux    Primary osteoarthritis, left shoulder    Regular astigmatism    PCOS (polycystic ovarian syndrome)     Diagnosed 2015         Medications:     Current Outpatient Medications on File Prior to Visit   Medication Sig Dispense Refill    ibuprofen (MOTRIN) 800 mg tablet TAKE 1 TABLET BY MOUTH EVERY 8 HOURS AS NEEDED FOR PAIN 30 Tablet 1    naltrexone-buPROPion (CONTRAVE) 8-90 mg TbER ER tablet Week 1 1 tab PO QAM, Week 2 1QAM 1QHS, Week 3 2QAM 1 QHS, Week 4 & beyond 2QAM 2QHS 112 Tablet 5    cholecalciferol (VITAMIN D3) (1000 Units /25 mcg) tablet       Desvenlafaxine 100 mg Tb24 Take 1 Tablet by mouth daily.  amphetamine-dextroamphetamine XR (ADDERALL XR) 20 mg XR capsule TAKE 2 CAPSULES BY MOUTH EVERY MORNING      Latuda 60 mg tab tablet Take 60 mg by mouth daily.  metFORMIN ER (GLUCOPHAGE XR) 500 mg tablet TAKE 1 TABLET BY MOUTH EVERY DAY WITH THE EVENING MEAL      spironolactone (ALDACTONE) 50 mg tablet Take 50 mg by mouth daily. No current facility-administered medications on file prior to visit.           Objective   Physical Assessment: VS:    Vitals:    11/12/21 0920   Pulse: 82   Resp: 16   Temp: 98.3 °F (36.8 °C)   PainSc:   6       Physical Exam  Musculoskeletal:      Right hip: Tenderness (glute med, piriformis) present. No bony tenderness. Normal range of motion. Normal strength. Left hip: No bony tenderness. Normal range of motion. Normal strength. Right knee: Normal range of motion. Tenderness (pes anserine, distal sartorius, distal semiT) present over the medial joint line and lateral joint line. No MCL or LCL tenderness. No LCL laxity or MCL laxity. Instability Tests: Medial Adrián test negative and lateral Adrián test negative. Left knee: Normal range of motion. Tenderness present over the medial joint line and lateral joint line. No MCL or LCL tenderness. No LCL laxity or MCL laxity. Instability Tests: Medial Adrián test negative and lateral Adrián test negative. Legs:       Comments: Negative thessaly bilaterally; poor VMO strength with SLS RIGHT > LEFT      negative thessaley    Recent Labs & Imaging:     XR Results (maximum last 3): Results from Appointment encounter on 11/12/21    XR KNEE RT MIN 4 V    Narrative  EXAM: Right Knee X-ray    INDICATION: Right knee pain    TECHNIQUE: 4 views of the right knee obtained. COMPARISON: None. FINDINGS: No joint effusion appreciated. The alignment of the knee is  unremarkable. No evidence of acute fracture or dislocation. The joint spaces are  preserved. There is no evidence of erosions or periostitis. No lytic or blastic  focus appreciated. The soft tissues are unremarkable. Impression  1. No acute pathology appreciated in the right knee.       Review of Previous Medical Records:     PCP note from 10/11/2021

## 2021-11-12 NOTE — PATIENT INSTRUCTIONS
To Do: - gentle hamstring & glute stretches for now  - ice as needed  - Be sure to give the physical therapists some feedback! Let them know: what your short term & long term goals are. .. If exercises seems too easy or too hard. .. If the previous PT session left you more sore than usual, etc.  It is important for them to know these things in order to tailor your rehab plan. The physical therapy office should be calling you to schedule in the next 1-2 weeks. If you don't hear from them after 2 weeks, call their office directly to check on the status of your referral.          VISIT SURVEY       · You may receive a survey regarding your visit today either by mail or email. · Please take the opportunity to let us know how we did. · This information helps us continue to improve and provide a great patient experience. TESTING / IMAGING RESULTS       If you have [facility] access:   Per federal regulations all of your results will be released to you and to your physician / provider simultaneously on [address]    o This means that it is likely that you will have the opportunity to review your results before your physician / provider!  Since all \"critical\" abnormal results are immediately called to the office / on-call providers on nights, weekends and holidays - please refrain from calling after hours when you receive abnormal results through [address] Instead, allow time for your physician / provider to review your results and then provide interpretation and/or guidance.  If the results are significantly abnormal and require time-sensitive action - guidance will be provided both via [employer] and via telephone.  For all other results (interpreted as \"normal\", \"abnormal but expected\", \"reassuring / stable\", \"slightly abnormal\") - non-urgent guidance will be provided via [employer] communication only.   Caleb  #[phone]      If you do NOT have [employer] access:   If the results are significantly abnormal and require time-sensitive action - guidance will be relayed to you via telephone.  For all other results (interpreted as \"normal\", \"abnormal but expected\", \"reassuring / stable\", \"slightly abnormal\") - non-urgent guidance will be mailed to you via [employer]      Results from Outside Facilities / Laboratories:  Results of tests performed at outside facilities / laboratories likely will not appear in the [employer].  o They may appear in the patient portals of those outside facilities / laboratories. o Please keep in mind that with your access to your patient portal directly to an outside facility / laboratory, you are likely viewing results before your physician / provider! Please allow time for your physician / provider to review your results and then provide interpretation and/or guidance. If you have questions about your results beyond the guidance provided in MyChart or in your results letter, please schedule a follow up appointment to discuss with your physician / provider. MEDICATION REFILLS         Please request medication refills through your pharmacy, to ensure the correct pharmacy is used.  Please allow at least 3 business days for refill requests to be addressed.  Refills will not be provided by the after hours/on call provider.

## 2021-11-12 NOTE — LETTER
NOTIFICATION RETURN TO WORK / SCHOOL    11/12/2021 9:59 AM    Ms. Radha Lorenzana  [facility]      To Whom It May Concern:    Radha Lorenzana is currently under the care of Conrad Koehler. ICD-10-CM ICD-9-CM    1. Right knee pain, unspecified chronicity  M25.561 719.46 XR KNEE RT MIN 4 V      REFERRAL TO PHYSICAL THERAPY   2. Right hip pain  M25.551 719.45 REFERRAL TO PHYSICAL THERAPY     No physical training until re-evaluated in office in 10-12 weeks. If there are questions or concerns please have the patient contact our office.         Sincerely,      Ruma Beard MD

## 2021-12-06 ENCOUNTER — APPOINTMENT (OUTPATIENT)
Dept: PHYSICAL THERAPY | Age: 31
End: 2021-12-06
Attending: FAMILY MEDICINE
Payer: OTHER GOVERNMENT

## 2021-12-15 ENCOUNTER — HOSPITAL ENCOUNTER (OUTPATIENT)
Dept: PHYSICAL THERAPY | Age: 31
Discharge: HOME | End: 2021-12-15
Attending: FAMILY MEDICINE
Payer: OTHER GOVERNMENT

## 2021-12-15 PROCEDURE — 97535 SELF CARE MNGMENT TRAINING: CPT

## 2021-12-15 PROCEDURE — 97161 PT EVAL LOW COMPLEX 20 MIN: CPT

## 2021-12-15 PROCEDURE — 97140 MANUAL THERAPY 1/> REGIONS: CPT

## 2021-12-15 NOTE — PROGRESS NOTES
[address] Karina Jane Camilla [address], 70 [facility] - Phone: [phone]  Fax: [phone] / 8674 [facility]  Patient Name: Maggie Anguiano : 1990   Medical   Diagnosis: Right hip pain [M25.551]  Right knee pain [M25.561] Treatment Diagnosis: Posterior Right Knee Pain   Onset Date: 2021     Referral Source: Sukhwinder Herrera MD Start of Care [facility] [place]): 12/15/2021   Prior Hospitalization: See medical history Provider #: [id number]   Prior Level of Function: No hx knee pain with running   Comorbidities: Depression, alcohol use   Medications: Verified on Patient Summary List   The Plan of Care and following information is based on the information from the initial evaluation.   ===========================================================================================  Assessment / key information:  Maggie Anguiano is a 27 y.o.  yo female with Dx of Right hip pain [M25.551]  Right knee pain [M25.561]. Patient reports onset of symptoms in September of this year while running with a sharp stop and twist. Patient reported immediate right posterior knee pain. Patient currently rates pain as 7/10 at worst, 1/10 at best, primarily located at posterior right knee. Patient complains of difficulty and increase pain with running, stair negotiation, marching and standing >1 hour. Objective Findings:  Right Knee AROM: 3 degree hyperextension and 125 degree of flexion. Manual Muscle Testing: right hip ER and IR strength 4/5. Decreased right hamstring and lateral hip length, right >left knee valgus and lateral glide of patella, left anterior pelvic rotation. Increased tone post right knee, hamstring and lateral thigh. Special Test: negative for ligamentous and meniscal involvement right knee. Decreased core activation. FOTO Score= 57 points.   Pt instructed in HEP and will f/u in clinic for PT.  ===========================================================================================  Eval Complexity: History HIGH Complexity :3+ comorbidities / personal factors will impact the outcome/ POC ;  Examination  HIGH Complexity : 4+ Standardized tests and measures addressing body structure, function, activity limitation and / or participation in recreation ; Presentation LOW Complexity : Stable, uncomplicated ;  Decision Making MEDIUM Complexity : FOTO score of 26-74; Overall Complexity LOW   Problem List: pain affecting function, decrease ROM, decrease strength, impaired gait/ balance, decrease ADL/ functional abilitiies, decrease activity tolerance and decrease flexibility/ joint mobility   Treatment Plan may include any combination of the following: Therapeutic exercise, Therapeutic activities, Neuromuscular re-education, Physical agent/modality, Manual therapy and Patient education  Patient / Family readiness to learn indicated by: asking questions, trying to perform skills and interest  Persons(s) to be included in education: patient (P)  Barriers to Learning/Limitations: None  Measures taken, if barriers to learning:    Patient Goal (s): \"Pain to go away, get back to working out. \"   Patient self reported health status: fair  Rehabilitation Potential: good   Short Term Goals: To be accomplished in  4  weeks:  1. Patient will increase FOTO Score to 62 points to improve standing tolerance. 2. Patient will achieve +2 on GROC to improve stair negotiation. 3. Patient will report 1/10 pain at worst to prepare for running.  Long Term Goals: To be accomplished in  8  weeks:  1. Patient will increase FOTO Score to 68 points to improve running tolerance. 2. Patient will achieve +4 on GROC to improve running tolerance. 3. Patient will report no complaints of pain with 1.5 mile run to prepare for test at work.   Frequency / Duration:   Patient to be seen  2  times per week for 8  weeks:  Patient / Caregiver education and instruction: exercises  Therapist Signature: Tatamercedes Roni, PT Date: 91/02/5417   Certification Period: n/a Time: 1:09 PM   ===========================================================================================  I certify that the above Physical Therapy Services are being furnished while the patient is under my care. I agree with the treatment plan and certify that this therapy is necessary. Physician Signature:        Date:       Time:                                        Isaac Man MD  Please sign and return to [facility]. or you may fax the signed copy to [phone]. Thank you.

## 2021-12-15 NOTE — PROGRESS NOTES
PHYSICAL THERAPY - DAILY TREATMENT NOTE      Patient Name: Kian Liao        Date: 12/15/2021  : 1990   YES Patient  Verified  Visit #:     Insurance: Payor:  / Plan: Pedro Pablo Narvaez 74 / Product Type:  /      In time: 11:45 Out time: 12:30   Total Treatment Time: 45     Medicare Time/BCBS Tracking (below)   Total Timed Codes (min):  n/a 1:1 Treatment Time:  n/a     TREATMENT AREA = Right hip pain [M25.551]  Right knee pain [M25.561]     SUBJECTIVE    Pain Level (on 0 to 10 scale):  1  / 10   Medication Changes/New allergies or changes in medical history, any new surgeries or procedures?     NO    If yes, update Summary List   Subjective Functional Status/Changes:  []  No changes reported       See Plan of Care       OBJECTIVE  Modalities Rationale:     decrease pain to improve patient's ability to run   min [] Estim, type/location:                                      []  att     []  unatt     []  w/US     []  w/ice    []  w/heat    min []  Mechanical Traction: type/lbs                   []  pro   []  sup   []  int   []  cont    []  before manual    []  after manual    min []  Ultrasound, settings/location:      min []  Iontophoresis w/ dexamethasone, location:                                               []  take home patch       []  in clinic   10 min [x]  Ice     []  Heat    location/position: Supine right knee    min []  Vasopneumatic Device, press/temp:        min []  Other:    [x] Skin assessment post-treatment (if applicable):    []  intact    [x]  redness- no adverse reaction                  []redness - adverse reaction:        10 min Manual Therapy: Technique:      [] S/DTM []IASTM []PROM [] Passive Stretching   []manual TPR    []Jt manipulation:Gr I [] II []  III [] IV[] V[]  Treatment Area:  MET correction left ant pelvic tilt, STM left post knee, ITB, hamstring   Rationale:      decrease pain, increase ROM and increase tissue extensibility to improve patient's ability to run    10 min Self Care: Renetta Roberts, reviewed office ergonomics   Rationale:    increase ROM and increase strength to improve the patients ability to run    Billed With/As:   [] TE   [] TA   [] Neuro   [x] Self Care Patient Education: [x] Review HEP    [] Progressed/Changed HEP based on:   [] positioning   [] body mechanics   [] transfers   [] heat/ice application    [] other:      Other Objective/Functional Measures:    See Plan of Care     Post Treatment Pain Level (on 0 to 10) scale:   1  / 10     ASSESSMENT    Assessment/Changes in Function:     See Plan of Care     []  See Progress Note/Recertification   Patient will continue to benefit from skilled PT services to modify and progress therapeutic interventions, address functional mobility deficits, address ROM deficits, address strength deficits, analyze and address soft tissue restrictions, analyze and cue movement patterns, analyze and modify body mechanics/ergonomics and assess and modify postural abnormalities to attain remaining goals. to attain remaining goals.    Progress toward goals / Updated goals:    See Plan of Care     PLAN    [x]  Upgrade activities as tolerated YES Continue plan of care   []  Discharge due to :    []  Other:      Therapist: Danny Crowe PT    Date: 12/15/2021 Time: 12:30 PM     Future Appointments   Date Time Provider Faustino Carlson   1/21/2022 10:30 AM MD KARISHMA Velazquez   10/11/2022  9:30 AM RIGO Stone AMB

## 2021-12-17 ENCOUNTER — HOSPITAL ENCOUNTER (OUTPATIENT)
Dept: PHYSICAL THERAPY | Age: 31
Discharge: HOME | End: 2021-12-17
Attending: FAMILY MEDICINE
Payer: OTHER GOVERNMENT

## 2021-12-17 PROCEDURE — 97530 THERAPEUTIC ACTIVITIES: CPT

## 2021-12-17 PROCEDURE — 97110 THERAPEUTIC EXERCISES: CPT

## 2021-12-17 PROCEDURE — 97140 MANUAL THERAPY 1/> REGIONS: CPT

## 2021-12-17 NOTE — PROGRESS NOTES
PHYSICAL THERAPY - DAILY TREATMENT NOTE      Patient Name: Kaur Verdin        Date: 2021  : 1990   YES Patient  Verified  Visit #:   2   of   12  Insurance: Payor:  / Plan: Pedro Pablo Narvaez 74 / Product Type:  /      In time: 9:47 Out time: 10:36   Total Treatment Time: 52     Medicare Time/BCBS Tracking (below)   Total Timed Codes (min):  n/a 1:1 Treatment Time:  n/a     TREATMENT AREA = Right hip pain [M25.551]  Right knee pain [M25.561]     SUBJECTIVE  Pain Level (on 0 to 10 scale):  1  / 10   Medication Changes/New allergies or changes in medical history, any new surgeries or procedures? NO    If yes, update Summary List   Subjective Functional Status/Changes:  []  No changes reported     Pt reports running and marching hurt knee.        OBJECTIVE  Modalities Rationale:     decrease pain to improve patient's ability to run   min [] Estim, type/location:                                      []  att     []  unatt     []  w/US     []  w/ice    []  w/heat    min []  Mechanical Traction: type/lbs                   []  pro   []  sup   []  int   []  cont    []  before manual    []  after manual    min []  Ultrasound, settings/location:      min []  Iontophoresis w/ dexamethasone, location:                                               []  take home patch       []  in clinic   10 min [x]  Ice     []  Heat    location/position: Supine right knee    min []  Vasopneumatic Device, press/temp:        min []  Other:    [x] Skin assessment post-treatment (if applicable):    []  intact    [x]  redness- no adverse reaction                  []redness - adverse reaction:        10 min Manual Therapy: STM left post knee, ITB, hamstring   Rationale:      decrease pain, increase ROM and increase tissue extensibility to improve patient's ability to run    9 min Therapeutic Activity: [x]  See flow sheet   Rationale:    increase strength and improve coordination to improve the patients ability to perform transfers and ADLs. 20 min Therapeutic Exercise:  [x]  See flow sheet   Rationale:      increase strength and improve coordination to improve the patients ability to improve ambulation. Billed With/As:   [] TE   [] TA   [] Neuro   [x] Self Care Patient Education: [x] Review HEP    [] Progressed/Changed HEP based on:   [] positioning   [] body mechanics   [] transfers   [] heat/ice application    [] other:      Other Objective/Functional Measures:    See flow sheet for exercises performed. Post Treatment Pain Level (on 0 to 10) scale:   0  / 10     ASSESSMENT    Assessment/Changes in Function:     Chart reviewed and subjective taken. Multiple trigger points in R HS with pt prone. Initiated therex with eccentric HS curl in prone f/b stretches and strengthening. MET correction done for L ant inn; conversation about potential for leg length discrepancy and monitoring. Pt noted improved pain post session. []  See Progress Note/Recertification   Patient will continue to benefit from skilled PT services to modify and progress therapeutic interventions, address functional mobility deficits, address ROM deficits, address strength deficits, analyze and address soft tissue restrictions, analyze and cue movement patterns, analyze and modify body mechanics/ergonomics and assess and modify postural abnormalities to attain remaining goals. to attain remaining goals. Progress toward goals / Updated goals: · Short Term Goals: To be accomplished in  4  weeks:  1. Patient will increase FOTO Score to 62 points to improve standing tolerance. 2. Patient will achieve +2 on GROC to improve stair negotiation. 3. Patient will report 1/10 pain at worst to prepare for running. · Long Term Goals: To be accomplished in  8  weeks:  1. Patient will increase FOTO Score to 68 points to improve running tolerance. 2. Patient will achieve +4 on GROC to improve running tolerance.   3. Patient will report no complaints of pain with 1.5 mile run to prepare for test at work.      PLAN  [x]  Upgrade activities as tolerated YES Continue plan of care   []  Discharge due to :    []  Other:      Therapist: Nguyen Freeman PT    Date: 12/17/2021 Time: 12:30 PM     Future Appointments   Date Time Provider Faustino Carlson   12/17/2021  9:45 AM [facility]   12/23/2021 10:00 AM [facility]   1/5/2022 12:30 PM [facility]   1/7/2022  9:30 AM [facility]ha [address]   1/12/2022 10:15 AM [facility]   1/14/2022 11:00 AM [facility]   1/21/2022 10:30 AM Elke Gallo MD W BS AMB   10/11/2022  9:30 AM Fransico Mello NP AMA BS AMB

## 2021-12-21 ENCOUNTER — HOSPITAL ENCOUNTER (OUTPATIENT)
Dept: PHYSICAL THERAPY | Age: 31
Discharge: HOME | End: 2021-12-21
Attending: FAMILY MEDICINE
Payer: OTHER GOVERNMENT

## 2021-12-21 ENCOUNTER — PATIENT MESSAGE (OUTPATIENT)
Dept: FAMILY MEDICINE CLINIC | Age: 31
End: 2021-12-21

## 2021-12-21 PROCEDURE — 97140 MANUAL THERAPY 1/> REGIONS: CPT

## 2021-12-21 PROCEDURE — 97110 THERAPEUTIC EXERCISES: CPT

## 2021-12-21 NOTE — PROGRESS NOTES
PHYSICAL THERAPY - DAILY TREATMENT NOTE      Patient Name: Jaimie Goncalves        Date: 2021  : 1990   YES Patient  Verified  Visit #:   3   of   12  Insurance: Payor:  / Plan: Pedro Pablo Narvaez 74 / Product Type:  /      In time: 2:03 Out time: 2:58   Total Treatment Time: 55     Medicare Time/BCBS Tracking (below)   Total Timed Codes (min):  n/a 1:1 Treatment Time:  n/a     TREATMENT AREA = Right hip pain [M25.551]  Right knee pain [M25.561]     SUBJECTIVE  Pain Level (on 0 to 10 scale):  1  / 10   Medication Changes/New allergies or changes in medical history, any new surgeries or procedures? NO    If yes, update Summary List   Subjective Functional Status/Changes:  []  No changes reported     Patient reports running around for 1.5 hours prior to coming to PT today. Didn't notice the back of the R knee until she walked up the clinic's stairs.         OBJECTIVE  Modalities Rationale:     decrease pain to improve patient's ability to run   min [] Estim, type/location:                                      []  att     []  unatt     []  w/US     []  w/ice    []  w/heat    min []  Mechanical Traction: type/lbs                   []  pro   []  sup   []  int   []  cont    []  before manual    []  after manual    min []  Ultrasound, settings/location:      min []  Iontophoresis w/ dexamethasone, location:                                               []  take home patch       []  in clinic   10 min [x]  Ice     []  Heat    location/position: To R posterior knee in longsit post-session    min []  Vasopneumatic Device, press/temp:        min []  Other:    [x] Skin assessment post-treatment (if applicable):    []  intact    [x]  redness- no adverse reaction                  []redness - adverse reaction:        15 min Manual Therapy: MET correction for L ant/R post; STM/DTM to L iliopsoas, L QL, and R distal hamstrings    Rationale:      decrease pain, increase ROM and increase tissue extensibility to improve patient's ability to run    30 min Therapeutic Exercise:  [x]  See flow sheet   Rationale:      increase strength and improve coordination to improve the patients ability to tolerate prolonged amb and standing activities. Billed With/As:   [x] TE   [] TA   [] Neuro   [] Self Care Patient Education: [x] Review HEP:   [place] Access Code Date Lisa Jacobo       [id number]     [x] Progressed/Changed HEP based on:   [] Addressed barriers and behaviors     [] Therapeutic Neuroscience Pain Education, metaphor, reframing, contexts. [] Sleep Education   [] Body Mechanics [] Healing Timeframe     [] Self STM with ball at \"the spot\"  [] Walking Program/Global Activity   [] other:        Other Objective/Functional Measures:    Access Code: [email or web address]  URL: [email or web address]  Date: 2021  Prepared by: Shital Madsen    Program Notes  PERFORM ALL EXERCISES TO YOUR TOLERANCE. IF SHARP PAIN OR RED FLAGS OCCUR, IMMEDIATELY STOP EXERCISE. Exercises  Supine Hamstring Stretch - 2 x daily - 7 x weekly - 10 reps - 3 hold  ORANGE TIED BAND AROUND KNEES - Supine Bridge with Resistance Band - 2 x daily - 4-5 x weekly - 15 reps - 3-5 hold  Sidelying Hip Abduction - 2 x daily - 4-5 x weekly - 10 reps - 5 hold  Prone Hip Extension - 2 x daily - 4-5 x weekly - 10 reps - 5 hold  Standing Gastroc Stretch - 2 x daily - 7 x weekly - 3 reps - 30 hold    *leg length: symmetrical   *increase tenderness in popliteus and along insertion of distal HS noted during MT   *added S/L hip abd to improve hip strength/stability     Post Treatment Pain Level (on 0 to 10) scale:   0  / 10     ASSESSMENT    Assessment/Changes in Function:     Patient noted no pain following PT session. Patient will be OOT next week therefore issued updated HEP to focus on glute strengthening and posterior chain stretching. Will follow up NV on HEP compliance and pain.       []  See Progress Note/Recertification Patient will continue to benefit from skilled PT services to modify and progress therapeutic interventions, address functional mobility deficits, address ROM deficits, address strength deficits, analyze and address soft tissue restrictions, analyze and cue movement patterns, analyze and modify body mechanics/ergonomics and assess and modify postural abnormalities to attain remaining goals. to attain remaining goals. Progress toward goals / Updated goals: · Short Term Goals: To be accomplished in  4  weeks:  1. Patient will increase FOTO Score to 62 points to improve standing tolerance. 2. Patient will achieve +2 on GROC to improve stair negotiation. 3. Patient will report 1/10 pain at worst to prepare for running. · Long Term Goals: To be accomplished in  8  weeks:  1. Patient will increase FOTO Score to 68 points to improve running tolerance. 2. Patient will achieve +4 on GROC to improve running tolerance. 3. Patient will report no complaints of pain with 1.5 mile run to prepare for test at work.      PLAN  [x]  Upgrade activities as tolerated YES Continue plan of care   []  Discharge due to :    []  Other:      Therapist: Jayashree West    Date: 12/21/2021 Time: 4:56 PM     Future Appointments   Date Time Provider Faustino Carlson   12/21/2021  2:00 PM Brennonrashaad Johnson [facility]   1/5/2022 12:30 PM Brennon Johnson [facility]   1/7/2022  9:30 AM Jaquan Purpura [address]   1/12/2022 10:15 AM Jaquan Purpura [address]   1/14/2022 11:00 AM [facility]quan Purpura [facility]   1/21/2022 10:30 AM Ayaz Gallo MD [facility] BS AMB   10/11/2022  9:30 AM RIGO Lazo BS AMB

## 2021-12-23 ENCOUNTER — APPOINTMENT (OUTPATIENT)
Dept: PHYSICAL THERAPY | Age: 31
End: 2021-12-23
Attending: FAMILY MEDICINE
Payer: OTHER GOVERNMENT

## 2022-01-12 ENCOUNTER — TELEPHONE (OUTPATIENT)
Dept: PHYSICAL THERAPY | Age: 32
End: 2022-01-12

## 2022-01-14 ENCOUNTER — APPOINTMENT (OUTPATIENT)
Dept: PHYSICAL THERAPY | Age: 32
End: 2022-01-14
Attending: FAMILY MEDICINE

## 2022-01-21 NOTE — PROGRESS NOTES
[address] [place] [address] PHYSICAL THERAPY  [address] [place] [address] 201,[facility], 70 [facility] - Phone: [phone]  Fax: [phone]    DISCHARGE NOTE  Patient Name: Jv Altamirano : 1990   Treatment/Medical Diagnosis: Right hip pain [M25.551]  Right knee pain [M25.561]   Referral Source: Matt Rick MD     Date of Initial Visit: 12/15/21 Attended Visits: 3 Missed Visits: 6       SUMMARY OF TREATMENT  Pt attended only initial evaluation and     2     follow-ups and then did not return. Therefore a formal reassessment of goals was not performed. RECOMMENDATIONS  Discontinue physical therapy due to patient not returning. If you have any questions/comments please contact us directly at [phone]. Thank you for allowing us to assist in the care of your patient.     Therapist Signature: Javon Watson PT, DPT Date: 22     Time: 10:01 AM